# Patient Record
Sex: MALE | Race: WHITE | NOT HISPANIC OR LATINO | Employment: OTHER | URBAN - METROPOLITAN AREA
[De-identification: names, ages, dates, MRNs, and addresses within clinical notes are randomized per-mention and may not be internally consistent; named-entity substitution may affect disease eponyms.]

---

## 2020-06-24 PROBLEM — I42.9 CARDIOMYOPATHY: Status: ACTIVE | Noted: 2018-08-29

## 2020-06-24 PROBLEM — I25.10 CORONARY ARTERY DISEASE INVOLVING NATIVE CORONARY ARTERY OF NATIVE HEART WITHOUT ANGINA PECTORIS: Status: ACTIVE | Noted: 2018-08-29

## 2020-06-25 PROBLEM — G62.9 PERIPHERAL POLYNEUROPATHY: Status: ACTIVE | Noted: 2020-06-25

## 2020-09-09 PROBLEM — I10 ESSENTIAL HYPERTENSION: Status: ACTIVE | Noted: 2020-09-09

## 2020-09-13 PROBLEM — Z86.73 HISTORY OF STROKE: Chronic | Status: ACTIVE | Noted: 2020-09-13

## 2020-09-13 PROBLEM — G62.9 PERIPHERAL POLYNEUROPATHY: Chronic | Status: ACTIVE | Noted: 2020-06-25

## 2020-09-13 PROBLEM — I10 ESSENTIAL HYPERTENSION: Chronic | Status: ACTIVE | Noted: 2020-09-09

## 2020-09-13 PROBLEM — U07.1 COVID-19: Status: ACTIVE | Noted: 2020-09-13

## 2020-09-13 PROBLEM — M1A.09X0 IDIOPATHIC CHRONIC GOUT OF MULTIPLE SITES WITHOUT TOPHUS: Chronic | Status: ACTIVE | Noted: 2020-09-13

## 2020-09-13 PROBLEM — E78.00 HIGH CHOLESTEROL: Chronic | Status: ACTIVE | Noted: 2020-09-13

## 2020-09-13 PROBLEM — I48.20 CHRONIC ATRIAL FIBRILLATION: Chronic | Status: ACTIVE | Noted: 2020-09-13

## 2020-09-13 PROBLEM — I48.20 CHRONIC ATRIAL FIBRILLATION: Status: ACTIVE | Noted: 2020-09-13

## 2020-09-13 PROBLEM — M54.16 LUMBAR RADICULOPATHY: Chronic | Status: ACTIVE | Noted: 2020-09-13

## 2022-06-27 PROBLEM — U07.1 COVID-19: Status: RESOLVED | Noted: 2020-09-13 | Resolved: 2022-06-27

## 2022-11-09 PROBLEM — I34.0 NONRHEUMATIC MITRAL VALVE REGURGITATION: Status: ACTIVE | Noted: 2022-06-06

## 2022-11-09 PROBLEM — I63.9 STROKE: Status: ACTIVE | Noted: 2022-11-09

## 2022-11-09 PROBLEM — I25.10 CORONARY ARTERY DISEASE: Status: ACTIVE | Noted: 2022-11-09

## 2022-11-09 PROBLEM — Z87.19 HISTORY OF REPAIR OF INGUINAL HERNIA: Status: ACTIVE | Noted: 2022-11-09

## 2022-11-09 PROBLEM — M10.9 GOUT: Status: ACTIVE | Noted: 2022-11-09

## 2022-11-09 PROBLEM — I25.2 OLD MYOCARDIAL INFARCTION: Status: ACTIVE | Noted: 2022-11-09

## 2022-11-09 PROBLEM — I10 HYPERTENSION: Status: ACTIVE | Noted: 2022-11-09

## 2022-11-09 PROBLEM — I73.9 PERIPHERAL VASCULAR DISEASE: Status: ACTIVE | Noted: 2022-11-09

## 2022-11-09 PROBLEM — Z98.890 HISTORY OF REPAIR OF INGUINAL HERNIA: Status: ACTIVE | Noted: 2022-11-09

## 2022-11-09 PROBLEM — I71.43 ANEURYSM OF INFRARENAL ABDOMINAL AORTA: Status: ACTIVE | Noted: 2022-11-09

## 2023-02-13 PROBLEM — I63.9 STROKE: Status: RESOLVED | Noted: 2022-11-09 | Resolved: 2023-02-13

## 2023-07-26 PROBLEM — N18.30 STAGE 3 CHRONIC KIDNEY DISEASE: Status: ACTIVE | Noted: 2023-07-26

## 2024-03-18 DIAGNOSIS — M54.50 LOW BACK PAIN: Primary | ICD-10-CM

## 2024-03-22 ENCOUNTER — CLINICAL SUPPORT (OUTPATIENT)
Dept: REHABILITATION | Facility: HOSPITAL | Age: 69
End: 2024-03-22
Payer: OTHER GOVERNMENT

## 2024-03-22 DIAGNOSIS — M54.41 CHRONIC BILATERAL LOW BACK PAIN WITH BILATERAL SCIATICA: Primary | ICD-10-CM

## 2024-03-22 DIAGNOSIS — G89.29 CHRONIC BILATERAL LOW BACK PAIN WITH BILATERAL SCIATICA: Primary | ICD-10-CM

## 2024-03-22 DIAGNOSIS — M54.42 CHRONIC BILATERAL LOW BACK PAIN WITH BILATERAL SCIATICA: Primary | ICD-10-CM

## 2024-03-22 NOTE — PROGRESS NOTES
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    Evaluation Date: 3/22/2024    Visit #/Visits authorized: [unfilled] 1/12    Precautions: Diabetes and blood thinners, History of stroke (Chronic) , Chronic atrial fibrillation (Chronic)     Subjective     Chief Concern: Chronic lower back pain radiation to both legs, swollen, special sensation and numbness in both legs for 10 years    Cancer Related Symptoms: None    Medical necessity is demonstrated by the following IMPAIRMENTS: Medical Necessity: Decreased mobility limits day to day activities, social, and emergent situations and Decreased quality of life                Aggravating Factors:  lying down   Relieving Factors:  ice    Symptom Description:     Quality:  Tingling, Numb, Sharp, and Electric  Severity:  10  Frequency:  continuously    Previous Treatments Tried:  Injection(s), Medication, and Imaging    HEENT:  WNL    Chest:  Chronic atrial fibrillation (Chronic)     Digestion:     Diet: in general, a "healthy" diet     Fluids: coffee 4 /day, is drinking plenty of fluids, none  Taste/Appetite: good   Symptoms: abdominal pain and diarrhea    Sleep: not good    Energy Levels:  8/10    Psychological Symptoms:  None    Other Symptoms: None      Objective     Observation: Looks healthy    Tongue:      Body:  swollen edges   Color:  red   Coating:  thick and yellow,    Pulse:        normal       New Findings:  None    Treatment     Treatment Principles:  Increase Qi and Blood    Acupuncture points used:      Bilateral points:GB 31, Si Ma Ly + 3, GB 32, Sophia on thighs + 3, SP 6, SP 7, SP 8, SP 9 , [place] + 4, GB 41, LV 3, ST 36, GB 34, GB 39, KD 7, Sophia on lower leg + 2  Unilateral points:  Left:  Right:  EA: Right: GB 31, Si Ma Ly + 3  EA: Left: GB 31, Si Ma Ly + 3  Auricular " Yes Treatment:  None  Needles In: 48  Needles Out: 48  Needles W/ STIM placed: 8: 30 AM  [place] W/ STIM removed: 9: 30 AM      Other Traditional Chinese Medicine Modalities - None    Assessment     After treatment, patient felt good     Patient prognosis is Good.     Patient will continue to benefit from acupuncture treatment to address the deficits listed in the problem list box on initial evaluation, provide patient family education and to maximize pt's level of independence in the home and community environment.     Patient's spiritual, cultural and educational needs considered and pt agreeable to plan of care and goals.     Anticipated barriers to treatment: None    Plan     Recommend 1-2 /week for 12 sessions then re-assess.      Education:  Patient is aware of cumulative benefit of acupuncture

## 2024-03-26 ENCOUNTER — CLINICAL SUPPORT (OUTPATIENT)
Dept: REHABILITATION | Facility: HOSPITAL | Age: 69
End: 2024-03-26
Payer: OTHER GOVERNMENT

## 2024-03-26 DIAGNOSIS — M54.41 CHRONIC BILATERAL LOW BACK PAIN WITH BILATERAL SCIATICA: Primary | ICD-10-CM

## 2024-03-26 DIAGNOSIS — G89.29 CHRONIC BILATERAL LOW BACK PAIN WITH BILATERAL SCIATICA: Primary | ICD-10-CM

## 2024-03-26 DIAGNOSIS — M54.42 CHRONIC BILATERAL LOW BACK PAIN WITH BILATERAL SCIATICA: Primary | ICD-10-CM

## 2024-03-26 DIAGNOSIS — E13.40 NEUROPATHY DUE TO SECONDARY DIABETES: ICD-10-CM

## 2024-03-26 PROCEDURE — 97814 ACUP 1/> W/ESTIM EA ADDL 15: CPT | Mod: PN

## 2024-03-26 PROCEDURE — 97813 ACUP 1/> W/ESTIM 1ST 15 MIN: CPT | Mod: PN

## 2024-03-26 NOTE — PROGRESS NOTES
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  Visit #/Visits authorized: 2/ 12     Precautions: Diabetes and blood thinners, History of stroke (Chronic) , Chronic atrial fibrillation (Chronic)     Subjective     Chief Complaint:  Chronic lower back pain radiation to both legs, swollen, special sensation and numbness in both legs for 10 years     Cancer Related Symptoms: None    Medical necessity is demonstrated by the following IMPAIRMENTS: Medical Necessity: Decreased mobility limits day to day activities, social, and emergent situations and Decreased quality of life    Response to Previous Treatment:  Good    Quality of Symptoms (Better/Worse):  Better    Other Condition/Symptoms:  None    Objective      New Findings:  None    Treatment Principles:   Increase Qi and Blood     Acupuncture points used:    Bilateral points:GB 31, Si Ma Ly + 3, GB 32, Sophia on thighs + 4, UB 40, UB 57,SP 6, SP 7, SP 8, SP 9 , [place] + 4, GB 41, LV 3, ST 36, GB 34, GB 39, KD 7, Sophia on lower leg + 3, Sophia on lower back + 4, UB 54  Unilateral points:  Left:  Right:  EA: Right: Sophia on lower back + 3, UB 54  EA: Left: Sophia on lower back + 3, UB 54  Auricular Treatment:  None  Needles In: 66  Needles Out: 66  Needles W/ STIM placed: 7: 50 AM  [place] W/ STIM removed: 8:55 AM    Other Traditional Chinese Medicine Modalities:  None    Recommendations:  PT    Education:  Patient is aware of cumulative effect of acupuncture      Assessment      Analysis of Treatment:  Patient felt good    Pt prognosis is Good.     Patient will continue to benefit from acupuncture treatment to address the deficits listed in the problem list box on initial evaluation, provide patient family  education and to maximize pt's level of independence in the home and community environment.     Patient's spiritual, cultural and educational needs considered and pt agreeable to plan of care and goals.     Anticipated barriers to treatment: None    Plan     Recommend     1   /week for 12  treatments and re-assess.

## 2024-04-02 ENCOUNTER — CLINICAL SUPPORT (OUTPATIENT)
Dept: REHABILITATION | Facility: HOSPITAL | Age: 69
End: 2024-04-02
Payer: OTHER GOVERNMENT

## 2024-04-02 DIAGNOSIS — G89.29 CHRONIC BILATERAL LOW BACK PAIN WITH BILATERAL SCIATICA: Primary | ICD-10-CM

## 2024-04-02 DIAGNOSIS — E13.40 NEUROPATHY DUE TO SECONDARY DIABETES: ICD-10-CM

## 2024-04-02 DIAGNOSIS — M54.41 CHRONIC BILATERAL LOW BACK PAIN WITH BILATERAL SCIATICA: Primary | ICD-10-CM

## 2024-04-02 DIAGNOSIS — M54.42 CHRONIC BILATERAL LOW BACK PAIN WITH BILATERAL SCIATICA: Primary | ICD-10-CM

## 2024-04-02 PROCEDURE — 97814 ACUP 1/> W/ESTIM EA ADDL 15: CPT | Mod: PN

## 2024-04-02 PROCEDURE — 97813 ACUP 1/> W/ESTIM 1ST 15 MIN: CPT | Mod: PN

## 2024-04-02 NOTE — PROGRESS NOTES
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  Visit #/Visits authorized: 3/ 12     Precautions: Diabetes and blood thinners, History of stroke (Chronic) , Chronic atrial fibrillation (Chronic)     Subjective     Chief Complaint:  Chronic lower back pain radiation to both legs, swollen, special sensation and numbness in both legs for 10 years     Cancer Related Symptoms: None    Medical necessity is demonstrated by the following IMPAIRMENTS: Medical Necessity: Decreased mobility limits day to day activities, social, and emergent situations and Decreased quality of life    Response to Previous Treatment:  Good    Quality of Symptoms (Better/Worse):  Better    Other Condition/Symptoms:  None    Objective      New Findings:  None    Treatment Principles:   Increase Qi and Blood     Acupuncture points used:    Bilateral points:GB 31, Si Ma Ly + 3, GB 32, Sophia on thighs +3, SP 6, SP 7, SP 8, SP 9 , [place] + 4, GB 41, LV 3, ST 36, GB 34, GB 39, KD 7,  Sophia on lower back + 4, UB 54  Unilateral points:  Left:  Right:  EA: Right: Sophia on lower back + 3, UB 54  EA: Left: Sophia on lower back + 3, UB 54  Auricular Treatment:  None  Needles In: 54  Needles Out: 54  [place] W/ STIM placed: 8: 00 AM  Needles W/ STIM removed: 8:55 AM    Other Traditional Chinese Medicine Modalities:  None    Recommendations:  PT    Education:  Patient is aware of cumulative effect of acupuncture      Assessment      Analysis of Treatment:  Patient felt good    Pt prognosis is Good.     Patient will continue to benefit from acupuncture treatment to address the deficits listed in the problem list box on initial evaluation, provide patient family education and to maximize pt's level  of independence in the home and community environment.     Patient's spiritual, cultural and educational needs considered and pt agreeable to plan of care and goals.     Anticipated barriers to treatment: None    Plan     Recommend     1   /week for 12  treatments and re-assess.

## 2024-04-05 ENCOUNTER — CLINICAL SUPPORT (OUTPATIENT)
Dept: REHABILITATION | Facility: HOSPITAL | Age: 69
End: 2024-04-05
Payer: OTHER GOVERNMENT

## 2024-04-05 DIAGNOSIS — E13.40 NEUROPATHY DUE TO SECONDARY DIABETES: ICD-10-CM

## 2024-04-05 DIAGNOSIS — M54.42 CHRONIC BILATERAL LOW BACK PAIN WITH BILATERAL SCIATICA: Primary | ICD-10-CM

## 2024-04-05 DIAGNOSIS — G89.29 CHRONIC BILATERAL LOW BACK PAIN WITH BILATERAL SCIATICA: Primary | ICD-10-CM

## 2024-04-05 DIAGNOSIS — M54.41 CHRONIC BILATERAL LOW BACK PAIN WITH BILATERAL SCIATICA: Primary | ICD-10-CM

## 2024-04-05 PROCEDURE — 97814 ACUP 1/> W/ESTIM EA ADDL 15: CPT | Mod: PN

## 2024-04-05 PROCEDURE — 97813 ACUP 1/> W/ESTIM 1ST 15 MIN: CPT | Mod: PN

## 2024-04-05 NOTE — PROGRESS NOTES
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  Visit #/Visits authorized: 4 / 12     Precautions: Diabetes and blood thinners, History of stroke (Chronic) , Chronic atrial fibrillation (Chronic)     Subjective     Chief Complaint:  Chronic lower back pain radiation to both legs, swollen, special sensation and numbness in both legs for 10 years     Cancer Related Symptoms: None    Medical necessity is demonstrated by the following IMPAIRMENTS: Medical Necessity: Decreased mobility limits day to day activities, social, and emergent situations and Decreased quality of life    Response to Previous Treatment:  Good    Quality of Symptoms (Better/Worse):  Better    Other Condition/Symptoms:  None    Objective      New Findings:  None    Treatment Principles:   Increase Qi and Blood     Acupuncture points used:    Bilateral points:GB 31, Si Ma Ly + 3, GB 32,  SP 6, SP 7, SP 8, SP 9 , [place] + 4, GB 41, LV 3, ST 36, GB 34, GB 39, KD 7,  Sophia on lower back + 3, UB 54, GB 30  Unilateral points:  Left:  Right:  EA: Right: Sophia on lower back + 2, UB 54, GB 30  EA: Left: Sophia on lower back + 2, UB 54, GB 30  EA: Right: GB 31, GB 34, Si Ma Ly + 1, ST 36  EA: Left: GB 31, GB 34, Si Ma Ly + 1, ST 36  Auricular Treatment:  None  Needles In: 48  Needles Out: 48  Needles W/ STIM placed: 8: 20 AM  [place] W/ STIM removed: 9:15 AM    Other Traditional Chinese Medicine Modalities:  None    Recommendations:  PT    Education:  Patient is aware of cumulative effect of acupuncture      Assessment      Analysis of Treatment:  Patient felt good    Pt prognosis is Good.     Patient will continue to benefit from acupuncture treatment to address the deficits listed in the  problem list box on initial evaluation, provide patient family education and to maximize pt's level of independence in the home and community environment.     Patient's spiritual, cultural and educational needs considered and pt agreeable to plan of care and goals.     Anticipated barriers to treatment: None    Plan     Recommend     1   /week for 12  treatments and re-assess.

## 2024-04-12 ENCOUNTER — CLINICAL SUPPORT (OUTPATIENT)
Dept: REHABILITATION | Facility: HOSPITAL | Age: 69
End: 2024-04-12
Payer: OTHER GOVERNMENT

## 2024-04-12 DIAGNOSIS — M54.42 CHRONIC BILATERAL LOW BACK PAIN WITH BILATERAL SCIATICA: Primary | ICD-10-CM

## 2024-04-12 DIAGNOSIS — M54.41 CHRONIC BILATERAL LOW BACK PAIN WITH BILATERAL SCIATICA: Primary | ICD-10-CM

## 2024-04-12 DIAGNOSIS — E13.40 NEUROPATHY DUE TO SECONDARY DIABETES: ICD-10-CM

## 2024-04-12 DIAGNOSIS — G89.29 CHRONIC BILATERAL LOW BACK PAIN WITH BILATERAL SCIATICA: Primary | ICD-10-CM

## 2024-04-12 PROCEDURE — 97814 ACUP 1/> W/ESTIM EA ADDL 15: CPT | Mod: PN

## 2024-04-12 PROCEDURE — 97813 ACUP 1/> W/ESTIM 1ST 15 MIN: CPT | Mod: PN

## 2024-04-12 NOTE — PROGRESS NOTES
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  Visit #/Visits authorized: 5 / 12     Precautions: Diabetes and blood thinners, History of stroke (Chronic) , Chronic atrial fibrillation (Chronic)     Subjective     Chief Complaint:  Chronic lower back pain radiation to both legs, swollen, special sensation and numbness in both legs for 10 years     Cancer Related Symptoms: None    Medical necessity is demonstrated by the following IMPAIRMENTS: Medical Necessity: Decreased mobility limits day to day activities, social, and emergent situations and Decreased quality of life    Response to Previous Treatment:  Good    Quality of Symptoms (Better/Worse):  Better    Other Condition/Symptoms:  None    Objective      New Findings:  None    Treatment Principles:   Increase Qi and Blood     Acupuncture points used:    Bilateral points:GB 31, Si Ma Ly + 3, GB 32,  SP 6,  SP 9 , [place] + 4, GB 41, LV 3, KD 3, KD 6, ST 36, GB 34, GB 39, ST 40, KD 7,  Sophia on lower back + 3, UB 54, GB 30  Unilateral points:  Left:  Right:  EA: Right: ST 36, GB 34, LV 3, SP 6  EA: Left: ST 36, GB 34, LV 3, SP 6  EA: Right: GB 31, Si Ma Ly + 3  EA: Left: GB 31, Si Ma Ly + 3  Auricular Treatment:  None  Needles In: 50  Needles Out: 50  Needles W/ STIM placed: 8: 10 AM  [place] W/ STIM removed: 9:15 AM    Other Traditional Chinese Medicine Modalities:  None    Recommendations:  PT    Education:  Patient is aware of cumulative effect of acupuncture      Assessment      Analysis of Treatment:  Patient felt good    Pt prognosis is Good.     Patient will continue to benefit from acupuncture treatment to address the deficits listed in the problem list box on initial evaluation, provide  patient family education and to maximize pt's level of independence in the home and community environment.     Patient's spiritual, cultural and educational needs considered and pt agreeable to plan of care and goals.     Anticipated barriers to treatment: None    Plan     Recommend     1   /week for 12  treatments and re-assess.

## 2024-04-19 ENCOUNTER — CLINICAL SUPPORT (OUTPATIENT)
Dept: REHABILITATION | Facility: HOSPITAL | Age: 69
End: 2024-04-19
Payer: OTHER GOVERNMENT

## 2024-04-19 DIAGNOSIS — E13.40 NEUROPATHY DUE TO SECONDARY DIABETES: ICD-10-CM

## 2024-04-19 DIAGNOSIS — G89.29 CHRONIC BILATERAL LOW BACK PAIN WITH BILATERAL SCIATICA: Primary | ICD-10-CM

## 2024-04-19 DIAGNOSIS — M54.41 CHRONIC BILATERAL LOW BACK PAIN WITH BILATERAL SCIATICA: Primary | ICD-10-CM

## 2024-04-19 DIAGNOSIS — M54.42 CHRONIC BILATERAL LOW BACK PAIN WITH BILATERAL SCIATICA: Primary | ICD-10-CM

## 2024-04-19 PROCEDURE — 97814 ACUP 1/> W/ESTIM EA ADDL 15: CPT | Mod: PN

## 2024-04-19 PROCEDURE — 97813 ACUP 1/> W/ESTIM 1ST 15 MIN: CPT | Mod: PN

## 2024-04-19 NOTE — PROGRESS NOTES
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  Visit #/Visits authorized: 6 / 12     Precautions: Diabetes and blood thinners, History of stroke (Chronic) , Chronic atrial fibrillation (Chronic)     Subjective     Chief Complaint:  Chronic lower back pain radiation to both legs, swollen, special sensation and numbness in both legs for 10 years     Cancer Related Symptoms: None    Medical necessity is demonstrated by the following IMPAIRMENTS: Medical Necessity: Decreased mobility limits day to day activities, social, and emergent situations and Decreased quality of life    Response to Previous Treatment:  Good    Quality of Symptoms (Better/Worse):  Better    Other Condition/Symptoms:  None    Objective      New Findings:  None    Treatment Principles:   Increase Qi and Blood     Acupuncture points used:    Bilateral points:GB 31, Si Ma Ly + 3, GB 32,  SP 6,  SP 9 , [place] + 4, GB 41, LV 3, KD 3, KD 6, ST 36, GB 34, GB 39, ST 40, KD 7,  Sophia on lower back + 3, UB 54, GB 30  Unilateral points:  Left:  Right:  EA: Right: ST 36, GB 34, LV 3, SP 6  EA: Left: ST 36, GB 34, LV 3, SP 6  EA: Right: UB 54, GB 30, Sophia on lower back + 2  EA: Left: UB 54, GB 30, Sophia on lower back + 2  Auricular Treatment:  None  Needles In: 50  Needles Out: 50  Needles W/ STIM placed: 8: 20 AM  [place] W/ STIM removed: 9:15 AM    Other Traditional Chinese Medicine Modalities:  None    Recommendations:  PT    Education:  Patient is aware of cumulative effect of acupuncture      Assessment      Analysis of Treatment:  Patient felt good    Pt prognosis is Good.     Patient will continue to benefit from acupuncture treatment to address the deficits listed in the problem list  box on initial evaluation, provide patient family education and to maximize pt's level of independence in the home and community environment.     Patient's spiritual, cultural and educational needs considered and pt agreeable to plan of care and goals.     Anticipated barriers to treatment: None    Plan     Recommend     1   /week for 12  treatments and re-assess.

## 2024-05-22 DIAGNOSIS — M54.59 OTHER LOW BACK PAIN: Primary | ICD-10-CM

## 2024-10-27 PROBLEM — R56.9 SEIZURE: Status: ACTIVE | Noted: 2024-10-27

## 2024-10-27 PROBLEM — E11.9 TYPE 2 DIABETES MELLITUS WITHOUT COMPLICATION, WITHOUT LONG-TERM CURRENT USE OF INSULIN: Status: ACTIVE | Noted: 2024-10-27

## 2025-02-25 ENCOUNTER — HOSPITAL ENCOUNTER (OUTPATIENT)
Dept: RADIOLOGY | Facility: HOSPITAL | Age: 70
Discharge: HOME | End: 2025-02-25
Attending: FAMILY MEDICINE
Payer: OTHER GOVERNMENT

## 2025-02-25 DIAGNOSIS — M25.561 ACUTE PAIN OF RIGHT KNEE: ICD-10-CM

## 2025-02-26 ENCOUNTER — HOSPITAL ENCOUNTER (OUTPATIENT)
Dept: RADIOLOGY | Facility: HOSPITAL | Age: 70
Discharge: HOME | End: 2025-02-26
Attending: FAMILY MEDICINE
Payer: OTHER GOVERNMENT

## 2025-02-26 PROCEDURE — 73560 X-RAY EXAM OF KNEE 1 OR 2: CPT | Mod: TC,FY,RT

## 2025-02-26 PROCEDURE — 73560 X-RAY EXAM OF KNEE 1 OR 2: CPT | Mod: 26,RT,, | Performed by: RADIOLOGY
